# Patient Record
Sex: MALE | Race: WHITE
[De-identification: names, ages, dates, MRNs, and addresses within clinical notes are randomized per-mention and may not be internally consistent; named-entity substitution may affect disease eponyms.]

---

## 2019-11-20 ENCOUNTER — HOSPITAL ENCOUNTER (OUTPATIENT)
Dept: HOSPITAL 61 - PCVCIMAG | Age: 74
Discharge: HOME | End: 2019-11-20
Attending: INTERNAL MEDICINE
Payer: MEDICARE

## 2019-11-20 DIAGNOSIS — E78.00: ICD-10-CM

## 2019-11-20 DIAGNOSIS — E78.5: ICD-10-CM

## 2019-11-20 DIAGNOSIS — I25.10: Primary | ICD-10-CM

## 2019-11-20 DIAGNOSIS — N18.9: ICD-10-CM

## 2019-11-20 DIAGNOSIS — E11.22: ICD-10-CM

## 2019-11-20 DIAGNOSIS — G47.33: ICD-10-CM

## 2019-11-20 DIAGNOSIS — I12.9: ICD-10-CM

## 2019-11-20 PROCEDURE — 93351 STRESS TTE COMPLETE: CPT

## 2019-11-20 PROCEDURE — 93325 DOPPLER ECHO COLOR FLOW MAPG: CPT

## 2019-11-21 NOTE — PCVCIMAG
--------------- APPROVED REPORT --------------





Study performed:  11/20/2019 13:03:56



Exam:  Stress Echocardiogram

Indication: CAD , Hyperlipidemia, Hypertension

Patient Location: Echo lab

Stress Nurse: Nubia Guerra RN

Room #: 2

Status: routine



Ht: 5 ft 11 in  

HR: 91 bpm      BP: 152/100 mmHg

Rhythm: NSR



Medical History

Medical History: HTN, Hyperlipidemia, CAD non obstructive

Cardiac Risk Factors: HTN, Hyperlipidemia, DM

Previous Cardiac Procedures: none

Exercise History: Physically active



Procedure

The patient underwent an Exercise Stress Test using the Nate 

Protocol. Blood pressure, heart rate, and EKG were monitored.

An Echocardiogram was performed by technician in four stages in quad 

fashion.  At peak stress, four selected images were obtained and 

placed side by side with resting images for comparison.



Stress Test Details

Stress Test:  Exercise stress testing was performed using a Nate 

protocol.

HR

Resting HR:            91 bpmMax Heart Rate (APMHR): 147 bpm 

Max HR Achieved:  151 bpmTarget HR (85% APMHR): 124 bpm

% of APMHR:         102

Recovery HR:            100 bpm

HR response to stress: Normal HR response to stress



BP

Resting BP:  152/100 mmHg

Max BP:       192/78 mmHg

Recovery BP:       144/78 mmHg

BP response to stress: hypertensive at rest,normal increases with 

stress, Patient held BP meds

ECG

Resting ECG:  Sinus Rhythm

Stress ECG:     Sinus Rhythm

ST Change: Non-ischemic

Maximum ST Deviation: -0.65 mm

Arrhythmia:    occ PVCs,coplet PVCs

Recovery ECG: Sinus Rhythm

Recovery ST Change: Non-ischemic

Recovery ST Deviation: -0.40 mm

Recovery Arrhythmia: Occ PVCS



Clinical

Reason for Termination: Maximal effort

Exercise duration: 7 min 37 sec

Highest Stage Achieved: Stage 3: 3.4 mph at 14% grade. 

Exercise capacity: 10.1 METs

Overall Exercise Capacity for Age: Average

Scale: Sedentary

Angina Score: None

No complications.



Stress ECG Conclusion

Kent Treadmill Score is 10.3 which is Low risk.



Pre-Stress Echo

The resting Echocardiogram showed normal left ventricular 

contractility with an estimated Ejection Fraction of about 55-60%. 

Normal wall motion in all segments on baseline images.



Post-Stress Echo

The stress Echocardiogram showed normal left ventricular 

contractility with an estimated Ejection Fraction of about 65-70%. 

Normal augmentation of wall motion in all segments on post stress 

images.



Clinical

No clinical or ECG evidence for ischemia.



Conclusion

Clinical Response:  Non-ischemic

Exercise Capacity:  Average

Stress ECG Response:  Non-ischemic

Stress Echo Images:  Non-ischemic

No clinical, EKG or echocardiographic evidence for ischemia. 

No echocardiographic evidence for exercise induced ischemia.

Normal stress echocardiogram with maximal exercise stress.

 Normal color doppler. No regurgitation or stenosis present on 

pulmonic, mitral, tricuspid and aortic valves.



<Conclusion>

No clinical, EKG or echocardiographic evidence for ischemia. 

No echocardiographic evidence for exercise induced ischemia.

Normal stress echocardiogram with maximal exercise stress.

 Normal color doppler. No regurgitation or stenosis present on 

pulmonic, mitral, tricuspid and aortic valves.